# Patient Record
Sex: MALE | Race: WHITE | NOT HISPANIC OR LATINO | Employment: OTHER | URBAN - METROPOLITAN AREA
[De-identification: names, ages, dates, MRNs, and addresses within clinical notes are randomized per-mention and may not be internally consistent; named-entity substitution may affect disease eponyms.]

---

## 2017-01-11 ENCOUNTER — TRANSCRIBE ORDERS (OUTPATIENT)
Dept: ADMINISTRATIVE | Facility: HOSPITAL | Age: 67
End: 2017-01-11

## 2017-01-11 ENCOUNTER — GENERIC CONVERSION - ENCOUNTER (OUTPATIENT)
Dept: OTHER | Facility: OTHER | Age: 67
End: 2017-01-11

## 2017-01-11 ENCOUNTER — ALLSCRIPTS OFFICE VISIT (OUTPATIENT)
Dept: OTHER | Facility: OTHER | Age: 67
End: 2017-01-11

## 2017-01-11 ENCOUNTER — HOSPITAL ENCOUNTER (OUTPATIENT)
Dept: RADIOLOGY | Facility: HOSPITAL | Age: 67
Discharge: HOME | End: 2017-01-11
Payer: COMMERCIAL

## 2017-01-11 DIAGNOSIS — M25.572 PAIN IN LEFT ANKLE: ICD-10-CM

## 2017-01-11 PROCEDURE — 73610 X-RAY EXAM OF ANKLE: CPT

## 2017-02-13 DIAGNOSIS — S82.63XA CLOSED DISPLACED FRACTURE OF LATERAL MALLEOLUS OF FIBULA: ICD-10-CM

## 2017-02-15 ENCOUNTER — HOSPITAL ENCOUNTER (OUTPATIENT)
Dept: RADIOLOGY | Facility: CLINIC | Age: 67
Discharge: HOME | End: 2017-02-15
Payer: COMMERCIAL

## 2017-02-15 ENCOUNTER — ALLSCRIPTS OFFICE VISIT (OUTPATIENT)
Dept: OTHER | Facility: OTHER | Age: 67
End: 2017-02-15

## 2017-02-15 DIAGNOSIS — S82.63XA CLOSED DISPLACED FRACTURE OF LATERAL MALLEOLUS OF FIBULA: ICD-10-CM

## 2017-02-15 PROCEDURE — 73610 X-RAY EXAM OF ANKLE: CPT

## 2018-01-11 NOTE — RESULT NOTES
Message   pt aware1       1 Amended By: Ramses Jain; Apr 08 2016 10:33 AM EST    Verified Results  (1) CBC/PLT/DIFF 07Apr2016 10:08AM Carlton Bench Order Number: [id number]     Order Number: [id number]     Test Name Result Flag Reference   WBC COUNT 4 40 Thousand/uL  4 31-10 16   RBC COUNT 4 80 Million/uL  3 88-5 62   HEMOGLOBIN 15 8 g/dL  12 0-17 0   HEMATOCRIT 45 1 %  36 5-49 3   MCV 94 fL  82-98   MCH 32 9 pg  26 8-34 3   MCHC 35 0 g/dL  31 4-37 4   RDW 13 4 %  11 6-15 1   MPV 10 3 fL  8 9-12 7   PLATELET COUNT 712 Thousands/uL  149-390   NEUTROPHILS RELATIVE PERCENT 48 %  43-75   LYMPHOCYTES RELATIVE PERCENT 37 %  14-44   MONOCYTES RELATIVE PERCENT 11 %  4-12   EOSINOPHILS RELATIVE PERCENT 3 %  0-6   BASOPHILS RELATIVE PERCENT 1 %  0-1   NEUTROPHILS ABSOLUTE COUNT 2 09 Thousands/µL  1 85-7 62   LYMPHOCYTES ABSOLUTE COUNT 1 63 Thousands/µL  0 60-4 47   MONOCYTES ABSOLUTE COUNT 0 49 Thousand/µL  0 17-1 22   EOSINOPHILS ABSOLUTE COUNT 0 15 Thousand/µL  0 00-0 61   BASOPHILS ABSOLUTE COUNT 0 04 Thousands/µL  0 00-0 10     (1) COMPREHENSIVE METABOLIC PANEL [id number] 00:65FD [facility] Order Number: [id number]      National Kidney Disease Education Program recommendations are as follows:  GFR calculation is accurate only with a steady state creatinine  Chronic Kidney disease less than 60 ml/min/1 73 sq  meters  Kidney failure less than 15 ml/min/1 73 sq  meters  Test Name Result Flag Reference   GLUCOSE,RANDM 111 mg/dL     If the patient is fasting, the ADA then defines impaired fasting glucose as > 100 mg/dL and diabetes as > or equal to 123 mg/dL     SODIUM 140 mmol/L  136-145   POTASSIUM 4 3 mmol/L  3 5-5 3   CHLORIDE 102 mmol/L  100-108   CARBON DIOXIDE 27 mmol/L  21-32   ANION GAP (CALC) 11 mmol/L  4-13   BLOOD UREA NITROGEN 13 mg/dL  5-25   CREATININE 0 91 mg/dL  0 60-1 30   Standardized to IDMS reference method   CALCIUM 8 7 mg/dL  8 3-10 1   BILI, TOTAL 0 89 mg/dL 0  20-1 00   ALK PHOSPHATAS 48 U/L     ALT (SGPT) 34 U/L  12-78   AST(SGOT) 26 U/L  5-45   ALBUMIN 3 6 g/dL  3 5-5 0   TOTAL PROTEIN 6 8 g/dL  6 4-8 2   eGFR Non-African American      >60 0 ml/min/1 73sq m     (1) LIPID PANEL, FASTING 07Apr2016 10:08AM Mirna Lee Order Number: [id number]      Triglyceride:         Normal              <150 mg/dl       Borderline High    150-199 mg/dl       High               200-499 mg/dl       Very High          >499 mg/dl  Cholesterol:         Desirable        <200 mg/dl      Borderline High  200-239 mg/dl      High             >239 mg/dl  HDL Cholesterol:        High    >59 mg/dL      Low     <41 mg/dL     Test Name Result Flag Reference   CHOLESTEROL 151 mg/dL     HDL,DIRECT 67 mg/dL H 40-60   Specimen collection should occur prior to Metamizole administration due to the potential for falsely depressed results  LDL CHOLESTEROL CALCULATED 61 mg/dL  0-100   TRIGLYCERIDES 116 mg/dL  <=150   Specimen collection should occur prior to N-Acetylcysteine or Metamizole administration due to the potential for falsely depressed results       (1) PSA (SCREEN) (Dx V76 44 Screen for Prostate Cancer) 07Apr2016 10:08AM Karin Garciafisher Order Number: [id number]    TW Order Number: [id number]     Test Name Result Flag Reference   PROSTATE SPECIFIC ANTIGEN 2 7 ng/mL  0 0-4 0       Discussion/Summary   please call    labs to include his lipids and psa were normal

## 2018-01-13 VITALS — SYSTOLIC BLOOD PRESSURE: 108 MMHG | TEMPERATURE: 98.2 F | DIASTOLIC BLOOD PRESSURE: 60 MMHG | HEART RATE: 68 BPM

## 2018-01-13 VITALS
BODY MASS INDEX: 24.33 KG/M2 | DIASTOLIC BLOOD PRESSURE: 72 MMHG | WEIGHT: 160 LBS | HEART RATE: 91 BPM | SYSTOLIC BLOOD PRESSURE: 107 MMHG

## 2018-01-15 VITALS
SYSTOLIC BLOOD PRESSURE: 112 MMHG | HEIGHT: 68 IN | BODY MASS INDEX: 24.25 KG/M2 | DIASTOLIC BLOOD PRESSURE: 66 MMHG | WEIGHT: 160 LBS | HEART RATE: 73 BPM

## 2018-01-15 NOTE — RESULT NOTES
Verified Results  (1) LYME ANTIBODY PROFILE W/REFLEX TO WESTERN BLOT [id number] 10:08AM Lopez Grade Order Number: [id number]    Performed at:  [address]  [id number]  : Darian Baez MD, Phone:  [phone]     Test Name Result Flag Reference   LYME 18 KD IGG Present A    LYME 23 KD IGG Absent     LYME 28 KD IGG Absent     LYME 30 KD IGG Present A    LYME 39 KD IGG Present A    LYME 39 KD IGM Absent     LYME 41 KD IGG Present A    LYME 45 KD IGG Present A    LYME 58 KD IGG Absent     LYME 66 KD IGG Present A    LYME 93 KD IGG Present A    LYME 23 KD IGM Absent     LYME 41 KD IGM Absent     LYME IGG WB INTERP  Positive A    Positive: 5 of the following                               Borrelia-specific bands:                               18,23,28,30,39,41,45,58,                               66, and 93  Negative: No bands or banding                               patterns which do not                               meet positive criteria  LYME IGM WB INTERP  Negative     Note: An equivocal or positive EIA result followed by a negativeWestern Blot result is considered NEGATIVE  An equivocal or positiveEIA result followed by a positive Western Blot is considered POSITIVEby the CDC  Positive: 2 of the following bands: 23,39 or 41Negative: No bands or banding patterns which do not meet positivecriteria  Criteria for positivity are those recommended by CDC/ASTPHLD p23=Osp C, t77=nrgrtclbhMunw:Sera from individuals with the following may cross react in theLyme Western Blot assays: other spirochetal diseases (periodontaldisease, leptospirosis, relapsing fever, yaws, and pinta);connective autoimmune (Rheumatoid Arthritis and Systemic LupusErythematosus and also individuals with Antinuclear Antibody);other infections COFFEE [facility] Spotted Fever; Derrick-Barr Virus,and Cytomegalovirus)  Pt aware1      1 Amended By: Mio Abreu;  Apr 14 2016 8:27 AM EST    Discussion/Summary   please call    his lyme testing came back positive  as was given at his last apt, to complete the doxycyline  no further testing recommend as long as he is feeling well

## 2018-01-16 NOTE — RESULT NOTES
Verified Results  * XR ANKLE 3+ VIEW LEFT [id number] 11:36AM Casandra Lee Order Number: [id number]     Test Name Result Flag Reference   XR ANKLE 3+ VW LEFT (Report)     LEFT ANKLE     INDICATION: Lateral ankle pain after fall     COMPARISON: None     VIEWS: 3; 3 images     FINDINGS:     There is an acute oblique nondisplaced distal fibular fracture  There is mild swelling laterally  No degenerative changes are seen  No lytic or blastic lesions are seen  Tiny posterior calcaneal bone spur noted incidentally  There may also be a small bone spur on the dorsal aspect of the midfoot  IMPRESSION:     Acute nondisplaced distal fibular fracture  ##imslh##imslh       Workstation performed: [id number]     Signed by:   Karla Casillas MD   1/11/17       Discussion/Summary   patient already called and informed of results  apt with Ortho   to be seen today           Patient seen by Ortho